# Patient Record
Sex: MALE | Race: ASIAN | URBAN - METROPOLITAN AREA
[De-identification: names, ages, dates, MRNs, and addresses within clinical notes are randomized per-mention and may not be internally consistent; named-entity substitution may affect disease eponyms.]

---

## 2019-01-03 ENCOUNTER — EMERGENCY (EMERGENCY)
Facility: HOSPITAL | Age: 44
LOS: 1 days | Discharge: HOME | End: 2019-01-03
Attending: EMERGENCY MEDICINE | Admitting: EMERGENCY MEDICINE
Payer: COMMERCIAL

## 2019-01-03 VITALS
DIASTOLIC BLOOD PRESSURE: 99 MMHG | SYSTOLIC BLOOD PRESSURE: 155 MMHG | HEART RATE: 66 BPM | OXYGEN SATURATION: 98 % | RESPIRATION RATE: 17 BRPM

## 2019-01-03 VITALS
OXYGEN SATURATION: 98 % | HEART RATE: 86 BPM | DIASTOLIC BLOOD PRESSURE: 103 MMHG | RESPIRATION RATE: 18 BRPM | SYSTOLIC BLOOD PRESSURE: 154 MMHG | TEMPERATURE: 98 F | WEIGHT: 164.91 LBS

## 2019-01-03 DIAGNOSIS — R10.84 GENERALIZED ABDOMINAL PAIN: ICD-10-CM

## 2019-01-03 DIAGNOSIS — K62.5 HEMORRHAGE OF ANUS AND RECTUM: ICD-10-CM

## 2019-01-03 DIAGNOSIS — I10 ESSENTIAL (PRIMARY) HYPERTENSION: ICD-10-CM

## 2019-01-03 LAB
ALBUMIN SERPL ELPH-MCNC: 4.1 G/DL — SIGNIFICANT CHANGE UP (ref 3.4–5)
ALP SERPL-CCNC: 74 U/L — SIGNIFICANT CHANGE UP (ref 40–120)
ALT FLD-CCNC: 38 U/L — SIGNIFICANT CHANGE UP (ref 12–42)
AMYLASE P1 CFR SERPL: 51 U/L — SIGNIFICANT CHANGE UP (ref 25–115)
ANION GAP SERPL CALC-SCNC: 10 MMOL/L — SIGNIFICANT CHANGE UP (ref 9–16)
APPEARANCE UR: CLEAR — SIGNIFICANT CHANGE UP
APTT BLD: 30.2 SEC — SIGNIFICANT CHANGE UP (ref 27.5–36.3)
AST SERPL-CCNC: 25 U/L — SIGNIFICANT CHANGE UP (ref 15–37)
BILIRUB SERPL-MCNC: 0.9 MG/DL — SIGNIFICANT CHANGE UP (ref 0.2–1.2)
BILIRUB UR-MCNC: NEGATIVE — SIGNIFICANT CHANGE UP
BUN SERPL-MCNC: 22 MG/DL — SIGNIFICANT CHANGE UP (ref 7–23)
CALCIUM SERPL-MCNC: 9.6 MG/DL — SIGNIFICANT CHANGE UP (ref 8.5–10.5)
CHLORIDE SERPL-SCNC: 103 MMOL/L — SIGNIFICANT CHANGE UP (ref 96–108)
CO2 SERPL-SCNC: 23 MMOL/L — SIGNIFICANT CHANGE UP (ref 22–31)
COLOR SPEC: YELLOW — SIGNIFICANT CHANGE UP
CREAT SERPL-MCNC: 0.97 MG/DL — SIGNIFICANT CHANGE UP (ref 0.5–1.3)
DIFF PNL FLD: NEGATIVE — SIGNIFICANT CHANGE UP
GLUCOSE SERPL-MCNC: 133 MG/DL — HIGH (ref 70–99)
GLUCOSE UR QL: NEGATIVE — SIGNIFICANT CHANGE UP
HCT VFR BLD CALC: 45.3 % — SIGNIFICANT CHANGE UP (ref 39–50)
HGB BLD-MCNC: 16.1 G/DL — SIGNIFICANT CHANGE UP (ref 13–17)
INR BLD: 0.94 — SIGNIFICANT CHANGE UP (ref 0.88–1.16)
KETONES UR-MCNC: NEGATIVE — SIGNIFICANT CHANGE UP
LEUKOCYTE ESTERASE UR-ACNC: NEGATIVE — SIGNIFICANT CHANGE UP
LIDOCAIN IGE QN: 164 U/L — SIGNIFICANT CHANGE UP (ref 73–393)
MCHC RBC-ENTMCNC: 31.8 PG — SIGNIFICANT CHANGE UP (ref 27–34)
MCHC RBC-ENTMCNC: 35.5 G/DL — SIGNIFICANT CHANGE UP (ref 32–36)
MCV RBC AUTO: 89.5 FL — SIGNIFICANT CHANGE UP (ref 80–100)
NITRITE UR-MCNC: NEGATIVE — SIGNIFICANT CHANGE UP
OB PNL STL: POSITIVE
PH UR: 5.5 — SIGNIFICANT CHANGE UP (ref 5–8)
PLATELET # BLD AUTO: 247 K/UL — SIGNIFICANT CHANGE UP (ref 150–400)
POTASSIUM SERPL-MCNC: 4.2 MMOL/L — SIGNIFICANT CHANGE UP (ref 3.5–5.3)
POTASSIUM SERPL-SCNC: 4.2 MMOL/L — SIGNIFICANT CHANGE UP (ref 3.5–5.3)
PROT SERPL-MCNC: 7.9 G/DL — SIGNIFICANT CHANGE UP (ref 6.4–8.2)
PROT UR-MCNC: NEGATIVE MG/DL — SIGNIFICANT CHANGE UP
PROTHROM AB SERPL-ACNC: 10.3 SEC — SIGNIFICANT CHANGE UP (ref 10–12.9)
RBC # BLD: 5.06 M/UL — SIGNIFICANT CHANGE UP (ref 4.2–5.8)
RBC # FLD: 11.4 % — SIGNIFICANT CHANGE UP (ref 10.3–14.5)
SODIUM SERPL-SCNC: 136 MMOL/L — SIGNIFICANT CHANGE UP (ref 132–145)
SP GR SPEC: 1.02 — SIGNIFICANT CHANGE UP (ref 1–1.03)
UROBILINOGEN FLD QL: 0.2 E.U./DL — SIGNIFICANT CHANGE UP
WBC # BLD: 7.5 K/UL — SIGNIFICANT CHANGE UP (ref 3.8–10.5)
WBC # FLD AUTO: 7.5 K/UL — SIGNIFICANT CHANGE UP (ref 3.8–10.5)

## 2019-01-03 PROCEDURE — 99285 EMERGENCY DEPT VISIT HI MDM: CPT

## 2019-01-03 PROCEDURE — 74177 CT ABD & PELVIS W/CONTRAST: CPT | Mod: 26

## 2019-01-03 RX ORDER — ACETAMINOPHEN 500 MG
975 TABLET ORAL ONCE
Qty: 0 | Refills: 0 | Status: COMPLETED | OUTPATIENT
Start: 2019-01-03 | End: 2019-01-03

## 2019-01-03 RX ORDER — SODIUM CHLORIDE 9 MG/ML
1000 INJECTION INTRAMUSCULAR; INTRAVENOUS; SUBCUTANEOUS ONCE
Qty: 0 | Refills: 0 | Status: COMPLETED | OUTPATIENT
Start: 2019-01-03 | End: 2019-01-03

## 2019-01-03 RX ORDER — MORPHINE SULFATE 50 MG/1
4 CAPSULE, EXTENDED RELEASE ORAL ONCE
Qty: 0 | Refills: 0 | Status: DISCONTINUED | OUTPATIENT
Start: 2019-01-03 | End: 2019-01-03

## 2019-01-03 RX ORDER — IOHEXOL 300 MG/ML
30 INJECTION, SOLUTION INTRAVENOUS ONCE
Qty: 0 | Refills: 0 | Status: COMPLETED | OUTPATIENT
Start: 2019-01-03 | End: 2019-01-03

## 2019-01-03 RX ORDER — MORPHINE SULFATE 50 MG/1
2 CAPSULE, EXTENDED RELEASE ORAL ONCE
Qty: 0 | Refills: 0 | Status: DISCONTINUED | OUTPATIENT
Start: 2019-01-03 | End: 2019-01-03

## 2019-01-03 RX ORDER — ONDANSETRON 8 MG/1
4 TABLET, FILM COATED ORAL ONCE
Qty: 0 | Refills: 0 | Status: COMPLETED | OUTPATIENT
Start: 2019-01-03 | End: 2019-01-03

## 2019-01-03 RX ADMIN — MORPHINE SULFATE 4 MILLIGRAM(S): 50 CAPSULE, EXTENDED RELEASE ORAL at 10:31

## 2019-01-03 RX ADMIN — SODIUM CHLORIDE 1000 MILLILITER(S): 9 INJECTION INTRAMUSCULAR; INTRAVENOUS; SUBCUTANEOUS at 10:31

## 2019-01-03 RX ADMIN — Medication 975 MILLIGRAM(S): at 09:30

## 2019-01-03 RX ADMIN — Medication 975 MILLIGRAM(S): at 10:31

## 2019-01-03 RX ADMIN — IOHEXOL 30 MILLILITER(S): 300 INJECTION, SOLUTION INTRAVENOUS at 09:30

## 2019-01-03 RX ADMIN — SODIUM CHLORIDE 500 MILLILITER(S): 9 INJECTION INTRAMUSCULAR; INTRAVENOUS; SUBCUTANEOUS at 09:28

## 2019-01-03 RX ADMIN — ONDANSETRON 4 MILLIGRAM(S): 8 TABLET, FILM COATED ORAL at 09:30

## 2019-01-03 RX ADMIN — MORPHINE SULFATE 2 MILLIGRAM(S): 50 CAPSULE, EXTENDED RELEASE ORAL at 09:45

## 2019-01-03 RX ADMIN — MORPHINE SULFATE 2 MILLIGRAM(S): 50 CAPSULE, EXTENDED RELEASE ORAL at 09:30

## 2019-01-03 NOTE — ED ADULT NURSE NOTE - NSIMPLEMENTINTERV_GEN_ALL_ED
Implemented All Universal Safety Interventions:  [place] to call system. Call bell, personal items and telephone within reach. Instruct patient to call for assistance. Room bathroom lighting operational. Non-slip footwear when patient is off stretcher. Physically safe environment: no spills, clutter or unnecessary equipment. Stretcher in lowest position, wheels locked, appropriate side rails in place.

## 2019-01-03 NOTE — SBIRT NOTE. - NSSBIRTSERVICES_GEN_A_ED_FT
Provided SBIRT services. Full Screen Positive. Brief Intervention Performed. Screening results were reviewed with the patient and patient was provided information about healthy guidelines and potential negative consequences associated with level of risk.   Motivation and readiness to reduce or stop use was discussed and goals and activities to make changes were suggested/offered.    Audit Score : 4    Dast Score : 2    Duration : 15 minutes

## 2019-01-03 NOTE — ED PROVIDER NOTE - OBJECTIVE STATEMENT
44 y/o male with PMHx of HTN presents to the ED with complaints of diffuse abd pain x 1 day, blood on toilet paper when wiping, and nausea. Denies vomiting, diarrhea, constipation, urinary complaints, or anticoagulants usage.

## 2019-01-03 NOTE — ED ADULT NURSE NOTE - OBJECTIVE STATEMENT
pt is a 44 y/o male presents ambulatory into the ED generalized abdominal pain with nausea and loose stool with blood (when wiping) since Sunday, worsening today. denies fevers/chills, vomiting, urinary sx, overuse of NSAIDS, anticoagulants.

## 2019-01-03 NOTE — ED PROVIDER NOTE - MEDICAL DECISION MAKING DETAILS
44 y/o male with diffuse abd pain and rectal bleeding. Will get CT abd and pelvis to r/o colitis. HNH stable here. Pt without any symptomatic anemia. 42 y/o male with diffuse abd pain and rectal bleeding. Will get CT abd and pelvis to eval for colitis. H&H stable here. Pt without any symptomatic anemia symptoms

## 2019-01-03 NOTE — ED PROVIDER NOTE - PROGRESS NOTE DETAILS
marie: PT seen and reassessed.  Patient symptomatically improved.   AAOX3, NAD, VSS.  Discussed test results w/ patient. Patient verbalized understanding of hospital course and outpatient plans, has decisional making capacity.  Will f/u w/ pmd in the next few days; patient will call for an appointment. Will return to the ED if there is any worsening of symptoms.  Patient able to ambulate at baseline, is tolerating PO intake The scribe's documentation has been prepared under my direction and personally reviewed by me in its entirety. I confirm that the note above accurately reflects all work, treatment, procedures, and medical decision making performed by me.  -Tre Zuniga MD

## 2020-10-21 ENCOUNTER — TRANSCRIPTION ENCOUNTER (OUTPATIENT)
Age: 45
End: 2020-10-21

## 2021-05-04 ENCOUNTER — TRANSCRIPTION ENCOUNTER (OUTPATIENT)
Age: 46
End: 2021-05-04

## 2021-06-26 ENCOUNTER — TRANSCRIPTION ENCOUNTER (OUTPATIENT)
Age: 46
End: 2021-06-26

## 2022-08-18 ENCOUNTER — EMERGENCY (EMERGENCY)
Facility: HOSPITAL | Age: 47
LOS: 1 days | Discharge: HOME | End: 2022-08-18
Attending: EMERGENCY MEDICINE
Payer: COMMERCIAL

## 2022-08-18 VITALS
TEMPERATURE: 98 F | HEART RATE: 82 BPM | SYSTOLIC BLOOD PRESSURE: 102 MMHG | WEIGHT: 169.09 LBS | RESPIRATION RATE: 19 BRPM | DIASTOLIC BLOOD PRESSURE: 70 MMHG | HEIGHT: 69 IN | OXYGEN SATURATION: 97 %

## 2022-08-18 LAB
ALBUMIN SERPL ELPH-MCNC: 4.6 G/DL — SIGNIFICANT CHANGE UP (ref 3.3–5)
ALP SERPL-CCNC: 77 U/L — SIGNIFICANT CHANGE UP (ref 40–120)
ALT FLD-CCNC: 19 U/L — SIGNIFICANT CHANGE UP (ref 10–45)
ANION GAP SERPL CALC-SCNC: 14 MMOL/L — SIGNIFICANT CHANGE UP (ref 5–17)
APTT BLD: 29.6 SEC — SIGNIFICANT CHANGE UP (ref 27.5–35.5)
AST SERPL-CCNC: 23 U/L — SIGNIFICANT CHANGE UP (ref 10–40)
BASOPHILS # BLD AUTO: 0.04 K/UL — SIGNIFICANT CHANGE UP (ref 0–0.2)
BASOPHILS NFR BLD AUTO: 0.5 % — SIGNIFICANT CHANGE UP (ref 0–2)
BILIRUB SERPL-MCNC: 1.1 MG/DL — SIGNIFICANT CHANGE UP (ref 0.2–1.2)
BUN SERPL-MCNC: 18 MG/DL — SIGNIFICANT CHANGE UP (ref 7–23)
CALCIUM SERPL-MCNC: 10.5 MG/DL — SIGNIFICANT CHANGE UP (ref 8.4–10.5)
CHLORIDE SERPL-SCNC: 104 MMOL/L — SIGNIFICANT CHANGE UP (ref 96–108)
CO2 SERPL-SCNC: 22 MMOL/L — SIGNIFICANT CHANGE UP (ref 22–31)
CREAT SERPL-MCNC: 1.43 MG/DL — HIGH (ref 0.5–1.3)
EGFR: 60 ML/MIN/1.73M2 — SIGNIFICANT CHANGE UP
EOSINOPHIL # BLD AUTO: 0.1 K/UL — SIGNIFICANT CHANGE UP (ref 0–0.5)
EOSINOPHIL NFR BLD AUTO: 1.2 % — SIGNIFICANT CHANGE UP (ref 0–6)
FLUAV AG NPH QL: SIGNIFICANT CHANGE UP
FLUBV AG NPH QL: SIGNIFICANT CHANGE UP
GLUCOSE SERPL-MCNC: 100 MG/DL — HIGH (ref 70–99)
HCT VFR BLD CALC: 42.9 % — SIGNIFICANT CHANGE UP (ref 39–50)
HGB BLD-MCNC: 14.4 G/DL — SIGNIFICANT CHANGE UP (ref 13–17)
IMM GRANULOCYTES NFR BLD AUTO: 0.5 % — SIGNIFICANT CHANGE UP (ref 0–1.5)
INR BLD: 1.12 RATIO — SIGNIFICANT CHANGE UP (ref 0.88–1.16)
LYMPHOCYTES # BLD AUTO: 1.73 K/UL — SIGNIFICANT CHANGE UP (ref 1–3.3)
LYMPHOCYTES # BLD AUTO: 19.9 % — SIGNIFICANT CHANGE UP (ref 13–44)
MAGNESIUM SERPL-MCNC: 2.5 MG/DL — SIGNIFICANT CHANGE UP (ref 1.6–2.6)
MCHC RBC-ENTMCNC: 28.8 PG — SIGNIFICANT CHANGE UP (ref 27–34)
MCHC RBC-ENTMCNC: 33.6 GM/DL — SIGNIFICANT CHANGE UP (ref 32–36)
MCV RBC AUTO: 85.8 FL — SIGNIFICANT CHANGE UP (ref 80–100)
MONOCYTES # BLD AUTO: 0.57 K/UL — SIGNIFICANT CHANGE UP (ref 0–0.9)
MONOCYTES NFR BLD AUTO: 6.6 % — SIGNIFICANT CHANGE UP (ref 2–14)
NEUTROPHILS # BLD AUTO: 6.2 K/UL — SIGNIFICANT CHANGE UP (ref 1.8–7.4)
NEUTROPHILS NFR BLD AUTO: 71.3 % — SIGNIFICANT CHANGE UP (ref 43–77)
NRBC # BLD: 0 /100 WBCS — SIGNIFICANT CHANGE UP (ref 0–0)
PLATELET # BLD AUTO: 241 K/UL — SIGNIFICANT CHANGE UP (ref 150–400)
POTASSIUM SERPL-MCNC: 3.8 MMOL/L — SIGNIFICANT CHANGE UP (ref 3.5–5.3)
POTASSIUM SERPL-SCNC: 3.8 MMOL/L — SIGNIFICANT CHANGE UP (ref 3.5–5.3)
PROT SERPL-MCNC: 7.2 G/DL — SIGNIFICANT CHANGE UP (ref 6–8.3)
PROTHROM AB SERPL-ACNC: 12.9 SEC — SIGNIFICANT CHANGE UP (ref 10.5–13.4)
RBC # BLD: 5 M/UL — SIGNIFICANT CHANGE UP (ref 4.2–5.8)
RBC # FLD: 12.3 % — SIGNIFICANT CHANGE UP (ref 10.3–14.5)
RSV RNA NPH QL NAA+NON-PROBE: SIGNIFICANT CHANGE UP
SARS-COV-2 RNA SPEC QL NAA+PROBE: SIGNIFICANT CHANGE UP
SODIUM SERPL-SCNC: 140 MMOL/L — SIGNIFICANT CHANGE UP (ref 135–145)
TROPONIN T, HIGH SENSITIVITY RESULT: 17 NG/L — SIGNIFICANT CHANGE UP (ref 0–51)
TROPONIN T, HIGH SENSITIVITY RESULT: 21 NG/L — SIGNIFICANT CHANGE UP (ref 0–51)
TSH SERPL-MCNC: 0.64 UIU/ML — SIGNIFICANT CHANGE UP (ref 0.27–4.2)
WBC # BLD: 8.68 K/UL — SIGNIFICANT CHANGE UP (ref 3.8–10.5)
WBC # FLD AUTO: 8.68 K/UL — SIGNIFICANT CHANGE UP (ref 3.8–10.5)

## 2022-08-18 PROCEDURE — 99220: CPT

## 2022-08-18 PROCEDURE — 71045 X-RAY EXAM CHEST 1 VIEW: CPT | Mod: 26

## 2022-08-18 PROCEDURE — 93010 ELECTROCARDIOGRAM REPORT: CPT

## 2022-08-18 RX ORDER — HEPARIN SODIUM 5000 [USP'U]/ML
5000 INJECTION INTRAVENOUS; SUBCUTANEOUS EVERY 12 HOURS
Refills: 0 | Status: DISCONTINUED | OUTPATIENT
Start: 2022-08-18 | End: 2022-08-18

## 2022-08-18 RX ORDER — ASPIRIN/CALCIUM CARB/MAGNESIUM 324 MG
81 TABLET ORAL DAILY
Refills: 0 | Status: DISCONTINUED | OUTPATIENT
Start: 2022-08-19 | End: 2022-08-22

## 2022-08-18 RX ORDER — SODIUM CHLORIDE 9 MG/ML
1000 INJECTION INTRAMUSCULAR; INTRAVENOUS; SUBCUTANEOUS ONCE
Refills: 0 | Status: COMPLETED | OUTPATIENT
Start: 2022-08-18 | End: 2022-08-18

## 2022-08-18 RX ORDER — METOPROLOL TARTRATE 50 MG
25 TABLET ORAL DAILY
Refills: 0 | Status: DISCONTINUED | OUTPATIENT
Start: 2022-08-18 | End: 2022-08-22

## 2022-08-18 RX ADMIN — SODIUM CHLORIDE 1000 MILLILITER(S): 9 INJECTION INTRAMUSCULAR; INTRAVENOUS; SUBCUTANEOUS at 20:25

## 2022-08-18 RX ADMIN — Medication 25 MILLIGRAM(S): at 22:43

## 2022-08-18 NOTE — ED PROVIDER NOTE - PHYSICAL EXAMINATION
GENERAL: Not in acute distress, non-toxic appearing  HEAD: normocephalic, atraumatic  HEENT: PERRLA, EOMI, normal conjunctiva, oral mucosa moist, neck supple  CARDIAC: regular rate and rhythm, normal S1 and S2,  no appreciable murmurs  PULM: clear to ascultation bilaterally, no crackles, rales, rhonchi, or wheezing  GI: abdomen nondistended, soft, nontender, no guarding or rebound tenderness  : No CVA tenderness, no suprapubic tenderness  NEURO: alert and oriented x 3, normal speech, no focal motor or sensory deficits, gait normal, no gross neurologic deficit  MSK: No visible deformities, no peripheral edema, calf tenderness/redness/swelling  SKIN: No visible rashes, dry, well-perfused  PSYCH: appropriate mood and affect

## 2022-08-18 NOTE — ED PROVIDER NOTE - OBJECTIVE STATEMENT
Patient is a 45 y/o male with no past medical history, not on any medications presented to the ER after he experienced palpitations and syncopal episode. Patient was playing golf and exerting himself when he experienced palpitations, changes in vision, and experienced a transient loss of consciousness lasting for a few seconds with complete regaining of his mental faculties. Patient did not experience any urinary/bowel incontinence. He denies any focal neurological deficits associated with this episode. Denies ever experiencing similar symptoms in the past. Patient reports a few transient episodes of palpitations which self resolved.    Upon the syncopal episode today, the patient presented to the urgent care where he was found to be in afib on 12 lead EKG. Patient self converted. Patient was given 162 mg of ASA and was advised to follow up in the ER.     Patient otherwise denies any complaints at the moment such as chest pain, palpitations, nausea, vomiting, headaches, neurological deficits, Patient is a 47 y/o male with no past medical history, not on any medications presented to the ER after he experienced palpitations and syncopal episode. Patient was playing golf and exerting himself when he experienced palpitations, changes in vision, and experienced a transient loss of consciousness lasting for a few seconds with complete regaining of his mental faculties. Patient did not experience any urinary/bowel incontinence. He denies any focal neurological deficits associated with this episode. Denies ever experiencing similar symptoms in the past. Patient reports a few transient episodes of palpitations which self resolved.    Upon the syncopal episode today, the patient presented to the urgent care where he was found to be in afib on 12 lead EKG. Patient self converted. Patient was given 162 mg of ASA and was advised to follow up in the ER.     Patient otherwise denies any complaints at the moment such as chest pain, palpitations, nausea, vomiting, headaches, neurological deficits,

## 2022-08-18 NOTE — ED CDU PROVIDER INITIAL DAY NOTE - OBJECTIVE STATEMENT
Patient is a 47 y/o male with no past medical history, not on any medications presented to the ER after he experienced palpitations and syncopal episode. Patient was playing golf and exerting himself when he experienced palpitations, changes in vision, and experienced a transient loss of consciousness lasting for a few seconds with complete regaining of his mental faculties. Patient did not experience any urinary/bowel incontinence. He denies any focal neurological deficits associated with this episode. Denies ever experiencing similar symptoms in the past. Patient reports a few transient episodes of palpitations which self resolved. Upon the syncopal episode today, the patient presented to the urgent care where he was found to be in Afib on 12 lead EKG. Patient self converted. Patient was given 162 mg of ASA and was advised to follow up in the ER.  Patient otherwise denies any complaints at the moment such as chest pain, palpitations, nausea, vomiting, headaches. PMD [facility].  ED course: EKG NSR. Cardiology consulted. Plan for echo and cardiology recommendations in CDU. Patient is a 45 y/o male with no past medical history, not on any medications presented to the ER after he experienced palpitations and syncopal episode. Patient was playing golf and exerting himself when he experienced palpitations, changes in vision, and experienced a transient loss of consciousness lasting for a few seconds with complete regaining of his mental faculties. Patient did not experience any urinary/bowel incontinence. He denies any focal neurological deficits associated with this episode. Denies ever experiencing similar symptoms in the past. Patient reports a few transient episodes of palpitations which self resolved. Upon the syncopal episode today, the patient presented to the urgent care where he was found to be in Afib on 12 lead EKG. Patient self converted. Patient was given 162 mg of ASA and was advised to follow up in the ER.  Patient otherwise denies any complaints at the moment such as chest pain, palpitations, nausea, vomiting, headaches. PMD [facility] [place].  ED course: EKG NSR. Cardiology consulted. Plan for echo and cardiology recommendations in CDU. Patient is a 45 y/o male with no past medical history, not on any medications presented to the ER after he experienced palpitations and syncopal episode. Patient was playing golf and exerting himself when he experienced palpitations, changes in vision, and experienced a transient loss of consciousness lasting for a few seconds with complete regaining of his mental faculties. Patient did not experience any urinary/bowel incontinence. He denies any focal neurological deficits associated with this episode. Denies ever experiencing similar symptoms in the past. Patient reports a few transient episodes of palpitations which self resolved. Upon the syncopal episode today, the patient presented to the urgent care where he was found to be in Afib. Patient self converted to NSR. Reportedly given Aspirin.  Patient otherwise denies any complaints at the moment such as chest pain, palpitations, nausea, vomiting, headaches. PMD [facility] [place].  ED course: EKG NSR. Cardiology consulted. Plan for echo and cardiology recommendations in CDU. Patient is a 47 y/o male with no past medical history, not on any medications presented to the ER after he experienced palpitations and syncopal episode. Patient was playing golf and exerting himself when he experienced palpitations, changes in vision, and experienced a transient loss of consciousness lasting for a few seconds with complete regaining of his mental faculties. Patient did not experience any urinary/bowel incontinence. He denies any focal neurological deficits associated with this episode. Denies ever experiencing similar symptoms in the past. Patient reports a few transient episodes of palpitations which self resolved. Upon the syncopal episode today, the patient presented to the urgent care where he was found to be in Afib. Patient self converted to NSR. Reportedly given Aspirin.  Patient otherwise denies any complaints at the moment such as chest pain, palpitations, nausea, vomiting, headaches. PMD [facility] [place].  ED course: EKG NSR. Cardiology consulted. Plan for echo and cardiology recommendations in CDU.

## 2022-08-18 NOTE — ED ADULT TRIAGE NOTE - NS ED NURSE AMBULANCES
[facility]

## 2022-08-18 NOTE — ED CDU PROVIDER INITIAL DAY NOTE - MEDICAL DECISION MAKING DETAILS
45 y/o male  ,a lot of stress at work ,looking for another job  with no past medical history presented with palpitations and syncopal episode while playing golf. Afib ON ecg at [facility] . Patient self converted and now in sinus rhythm. Patient otherwise denies chest pain. Troponin is normal. Spoke with Dr. Sarmiento cardiology, plan to admit to CDU. Repeat troponin and EKG. Echo and holter tomorrow.  ZR 47 y/o male  ,a lot of stress at work ,looking for another job  with no past medical history presented with palpitations and syncopal episode while playing golf. Afib ON ecg at [facility] . Patient self converted and now in sinus rhythm. Patient otherwise denies chest pain. Troponin is normal. Spoke with Dr. Sarmiento cardiology, plan to admit to CDU. Repeat troponin and EKG. Echo and holter tomorrow.  ZR

## 2022-08-18 NOTE — CONSULT NOTE ADULT - SUBJECTIVE AND OBJECTIVE BOX
CHIEF COMPLAINT:Patient is a 46y old  Male who presents with a chief complaint of     HPI:  Patient is a 45 y/o male with no past medical history, not on any medications presented to the ER after he experienced palpitations and syncopal episode. Patient was playing golf and exerting himself when he experienced palpitations, changes in vision, and experienced a transient loss of consciousness lasting for a few seconds with complete regaining of his mental faculties. Patient did not experience any urinary/bowel incontinence. He denies any focal neurological deficits associated with this episode. Denies ever experiencing similar symptoms in the past. Patient reports a few transient episodes of palpitations which self resolved.  	Upon the syncopal episode today, the patient presented to the urgent care where he was found to be in afib on 12 lead EKG. Patient self converted. Patient was given 162 mg of ASA and was advised to follow up in the ER.     	Patient otherwise denies any complaints at the moment such as chest pain, palpitations, nausea, vomiting, headaches, neurological deficits,    PAST MEDICAL & SURGICAL HISTORY:      MEDICATIONS  (STANDING):  none    MEDICATIONS  (PRN):      FAMILY HISTORY:      SOCIAL HISTORY:    [x ] Non-smoker  [ ] Smoker  [ ] Alcohol    Allergies    dust (Urticaria)  No Known Drug Allergies    Intolerances    	    REVIEW OF SYSTEMS:  CONSTITUTIONAL: No fever, weight loss, or fatigue  EYES: No eye pain, visual disturbances, or discharge  ENT:  No difficulty hearing, tinnitus, vertigo; No sinus or throat pain  NECK: No pain or stiffness  RESPIRATORY: No cough, wheezing, chills or hemoptysis; No Shortness of Breath  CARDIOVASCULAR: No chest pain, + palpitations, passing out, dizziness, or leg swelling  GASTROINTESTINAL: No abdominal or epigastric pain. No nausea, vomiting, or hematemesis; No diarrhea or constipation. No melena or hematochezia.  GENITOURINARY: No dysuria, frequency, hematuria, or incontinence  NEUROLOGICAL: No headaches, memory loss, loss of strength, numbness, or tremors  SKIN: No itching, burning, rashes, or lesions   LYMPH Nodes: No enlarged glands  ENDOCRINE: No heat or cold intolerance; No hair loss  MUSCULOSKELETAL: No joint pain or swelling; No muscle, back, or extremity pain  PSYCHIATRIC: No depression, anxiety, mood swings, or difficulty sleeping  HEME/LYMPH: No easy bruising, or bleeding gums  ALLERGY AND IMMUNOLOGIC: No hives or eczema	    [ ] All others negative	  [ ] Unable to obtain    PHYSICAL EXAM:  T(C): 36.7 (08-18-22 @ 20:54), Max: 36.7 (08-18-22 @ 16:59)  HR: 70 (08-18-22 @ 20:51) (70 - 82)  BP: 130/85 (08-18-22 @ 20:51) (102/70 - 130/85)  RR: 18 (08-18-22 @ 20:51) (18 - 19)  SpO2: 99% (08-18-22 @ 20:51) (97% - 99%)  Wt(kg): --  I&O's Summary      Appearance: Normal	  HEENT:   Normal oral mucosa, PERRL, EOMI	  Lymphatic: No lymphadenopathy  Cardiovascular: Normal S1 S2, No JVD, + murmurs, No edema  Respiratory: Lungs clear to auscultation	  Psychiatry: A & O x 3, Mood & affect appropriate  Gastrointestinal:  Soft, Non-tender, + BS	  Skin: No rashes, No ecchymoses, No cyanosis	  Neurologic: Non-focal  Extremities: Normal range of motion, No clubbing, cyanosis or edema  Vascular: Peripheral pulses palpable 2+ bilaterally    TELEMETRY: 	    ECG:  	  RADIOLOGY:  OTHER: 	  	  LABS:	 	    CARDIAC MARKERS:  CHIEF COMPLAINT:Patient is a 46y old  Male who presents with a chief complaint of     HPI:      PAST MEDICAL & SURGICAL HISTORY:      MEDICATIONS  (STANDING):    MEDICATIONS  (PRN):      FAMILY HISTORY:      SOCIAL HISTORY:    [ ] Non-smoker  [ ] Smoker  [ ] Alcohol    Allergies    dust (Urticaria)  No Known Drug Allergies    Intolerances    	    REVIEW OF SYSTEMS:  CONSTITUTIONAL: No fever, weight loss, or fatigue  EYES: No eye pain, visual disturbances, or discharge  ENT:  No difficulty hearing, tinnitus, vertigo; No sinus or throat pain  NECK: No pain or stiffness  RESPIRATORY: No cough, wheezing, chills or hemoptysis; No Shortness of Breath  CARDIOVASCULAR: No chest pain, palpitations, passing out, dizziness, or leg swelling  GASTROINTESTINAL: No abdominal or epigastric pain. No nausea, vomiting, or hematemesis; No diarrhea or constipation. No melena or hematochezia.  GENITOURINARY: No dysuria, frequency, hematuria, or incontinence  NEUROLOGICAL: No headaches, memory loss, loss of strength, numbness, or tremors  SKIN: No itching, burning, rashes, or lesions   LYMPH Nodes: No enlarged glands  ENDOCRINE: No heat or cold intolerance; No hair loss  MUSCULOSKELETAL: No joint pain or swelling; No muscle, back, or extremity pain  PSYCHIATRIC: No depression, anxiety, mood swings, or difficulty sleeping  HEME/LYMPH: No easy bruising, or bleeding gums  ALLERGY AND IMMUNOLOGIC: No hives or eczema	    [ ] All others negative	  [ ] Unable to obtain    PHYSICAL EXAM:  T(C): 36.7 (08-18-22 @ 20:54), Max: 36.7 (08-18-22 @ 16:59)  HR: 70 (08-18-22 @ 20:51) (70 - 82)  BP: 130/85 (08-18-22 @ 20:51) (102/70 - 130/85)  RR: 18 (08-18-22 @ 20:51) (18 - 19)  SpO2: 99% (08-18-22 @ 20:51) (97% - 99%)  Wt(kg): --  I&O's Summary      Appearance: Normal	  HEENT:   Normal oral mucosa, PERRL, EOMI	  Lymphatic: No lymphadenopathy  Cardiovascular: Normal S1 S2, No JVD, + murmurs, No edema  Respiratory: Lungs clear to auscultation	  Psychiatry: A & O x 3, Mood & affect appropriate  Gastrointestinal:  Soft, Non-tender, + BS	  Skin: No rashes, No ecchymoses, No cyanosis	  Neurologic: Non-focal  Extremities: Normal range of motion, No clubbing, cyanosis or edema  Vascular: Peripheral pulses palpable 2+ bilaterally    TELEMETRY: 	    ECG:  	  RADIOLOGY:  OTHER: 	  	  LABS:	 	    CARDIAC MARKERS:                              14.4   8.68  )-----------( 241      ( 18 Aug 2022 17:41 )             42.9     08-18    142  |  104  |  18  ----------------------------<  109<H>  3.8   |  25  |  1.25    Ca    10.0      18 Aug 2022 20:21  Mg     2.5     08-18    TPro  7.2  /  Alb  4.6  /  TBili  1.1  /  DBili  x   /  AST  23  /  ALT  19  /  AlkPhos  77  08-18    proBNP:   Lipid Profile:   HgA1c:   TSH:   PT/INR - ( 18 Aug 2022 17:41 )   PT: 12.9 sec;   INR: 1.12 ratio         PTT - ( 18 Aug 2022 17:41 )  PTT:29.6 sec    PREVIOUS DIAGNOSTIC TESTING:    < from: Xray Chest 1 View- PORTABLE-Urgent (08.18.22 @ 17:25) >  INTERPRETATION:  clear lungs                 CHIEF COMPLAINT:Patient is a 46y old  Male who presents with a chief complaint of     HPI:  Patient is a 47 y/o male with no past medical history, not on any medications presented to the ER after he experienced palpitations and syncopal episode. Patient was playing golf and exerting himself when he experienced palpitations, changes in vision, and experienced a transient loss of consciousness lasting for a few seconds with complete regaining of his mental faculties. Patient did not experience any urinary/bowel incontinence. He denies any focal neurological deficits associated with this episode. Denies ever experiencing similar symptoms in the past. Patient reports a few transient episodes of palpitations which self resolved.  	Upon the syncopal episode today, the patient presented to the urgent care where he was found to be in afib on 12 lead EKG. Patient self converted. Patient was given 162 mg of ASA and was advised to follow up in the ER.     	Patient otherwise denies any complaints at the moment such as chest pain, palpitations, nausea, vomiting, headaches, neurological deficits,    PAST MEDICAL & SURGICAL HISTORY:      MEDICATIONS  (STANDING):  none    MEDICATIONS  (PRN):      FAMILY HISTORY:      SOCIAL HISTORY:    [x ] Non-smoker  [ ] Smoker  [ ] Alcohol    Allergies    dust (Urticaria)  No Known Drug Allergies    Intolerances    	    REVIEW OF SYSTEMS:  CONSTITUTIONAL: No fever, weight loss, or fatigue  EYES: No eye pain, visual disturbances, or discharge  ENT:  No difficulty hearing, tinnitus, vertigo; No sinus or throat pain  NECK: No pain or stiffness  RESPIRATORY: No cough, wheezing, chills or hemoptysis; No Shortness of Breath  CARDIOVASCULAR: No chest pain, + palpitations, passing out, dizziness, or leg swelling  GASTROINTESTINAL: No abdominal or epigastric pain. No nausea, vomiting, or hematemesis; No diarrhea or constipation. No melena or hematochezia.  GENITOURINARY: No dysuria, frequency, hematuria, or incontinence  NEUROLOGICAL: No headaches, memory loss, loss of strength, numbness, or tremors  SKIN: No itching, burning, rashes, or lesions   LYMPH Nodes: No enlarged glands  ENDOCRINE: No heat or cold intolerance; No hair loss  MUSCULOSKELETAL: No joint pain or swelling; No muscle, back, or extremity pain  PSYCHIATRIC: No depression, anxiety, mood swings, or difficulty sleeping  HEME/LYMPH: No easy bruising, or bleeding gums  ALLERGY AND IMMUNOLOGIC: No hives or eczema	    [ ] All others negative	  [ ] Unable to obtain    PHYSICAL EXAM:  T(C): 36.7 (08-18-22 @ 20:54), Max: 36.7 (08-18-22 @ 16:59)  HR: 70 (08-18-22 @ 20:51) (70 - 82)  BP: 130/85 (08-18-22 @ 20:51) (102/70 - 130/85)  RR: 18 (08-18-22 @ 20:51) (18 - 19)  SpO2: 99% (08-18-22 @ 20:51) (97% - 99%)  Wt(kg): --  I&O's Summary      Appearance: Normal	  HEENT:   Normal oral mucosa, PERRL, EOMI	  Lymphatic: No lymphadenopathy  Cardiovascular: Normal S1 S2, No JVD, + murmurs, No edema  Respiratory: Lungs clear to auscultation	  Psychiatry: A & O x 3, Mood & affect appropriate  Gastrointestinal:  Soft, Non-tender, + BS	  Skin: No rashes, No ecchymoses, No cyanosis	  Neurologic: Non-focal  Extremities: Normal range of motion, No clubbing, cyanosis or edema  Vascular: Peripheral pulses palpable 2+ bilaterally    TELEMETRY: 	    ECG:  	  RADIOLOGY:  OTHER: 	  	  LABS:	 	    CARDIAC MARKERS:  CHIEF COMPLAINT:Patient is a 46y old  Male who presents with a chief complaint of     HPI:      PAST MEDICAL & SURGICAL HISTORY:      MEDICATIONS  (STANDING):    MEDICATIONS  (PRN):      FAMILY HISTORY:      SOCIAL HISTORY:    [ ] Non-smoker  [ ] Smoker  [ ] Alcohol    Allergies    dust (Urticaria)  No Known Drug Allergies    Intolerances    	    REVIEW OF SYSTEMS:  CONSTITUTIONAL: No fever, weight loss, or fatigue  EYES: No eye pain, visual disturbances, or discharge  ENT:  No difficulty hearing, tinnitus, vertigo; No sinus or throat pain  NECK: No pain or stiffness  RESPIRATORY: No cough, wheezing, chills or hemoptysis; No Shortness of Breath  CARDIOVASCULAR: No chest pain, palpitations, passing out, dizziness, or leg swelling  GASTROINTESTINAL: No abdominal or epigastric pain. No nausea, vomiting, or hematemesis; No diarrhea or constipation. No melena or hematochezia.  GENITOURINARY: No dysuria, frequency, hematuria, or incontinence  NEUROLOGICAL: No headaches, memory loss, loss of strength, numbness, or tremors  SKIN: No itching, burning, rashes, or lesions   LYMPH Nodes: No enlarged glands  ENDOCRINE: No heat or cold intolerance; No hair loss  MUSCULOSKELETAL: No joint pain or swelling; No muscle, back, or extremity pain  PSYCHIATRIC: No depression, anxiety, mood swings, or difficulty sleeping  HEME/LYMPH: No easy bruising, or bleeding gums  ALLERGY AND IMMUNOLOGIC: No hives or eczema	    [ ] All others negative	  [ ] Unable to obtain    PHYSICAL EXAM:  T(C): 36.7 (08-18-22 @ 20:54), Max: 36.7 (08-18-22 @ 16:59)  HR: 70 (08-18-22 @ 20:51) (70 - 82)  BP: 130/85 (08-18-22 @ 20:51) (102/70 - 130/85)  RR: 18 (08-18-22 @ 20:51) (18 - 19)  SpO2: 99% (08-18-22 @ 20:51) (97% - 99%)  Wt(kg): --  I&O's Summary      Appearance: Normal	  HEENT:   Normal oral mucosa, PERRL, EOMI	  Lymphatic: No lymphadenopathy  Cardiovascular: Normal S1 S2, No JVD, + murmurs, No edema  Respiratory: Lungs clear to auscultation	  Psychiatry: A & O x 3, Mood & affect appropriate  Gastrointestinal:  Soft, Non-tender, + BS	  Skin: No rashes, No ecchymoses, No cyanosis	  Neurologic: Non-focal  Extremities: Normal range of motion, No clubbing, cyanosis or edema  Vascular: Peripheral pulses palpable 2+ bilaterally    TELEMETRY: 	    ECG:  	  RADIOLOGY:  OTHER: 	  	  LABS:	 	    CARDIAC MARKERS:                              14.4   8.68  )-----------( 241      ( 18 Aug 2022 17:41 )             42.9     08-18    142  |  104  |  18  ----------------------------<  109<H>  3.8   |  25  |  1.25    Ca    10.0      18 Aug 2022 20:21  Mg     2.5     08-18    TPro  7.2  /  Alb  4.6  /  TBili  1.1  /  DBili  x   /  AST  23  /  ALT  19  /  AlkPhos  77  08-18    proBNP:   Lipid Profile:   HgA1c:   TSH:   PT/INR - ( 18 Aug 2022 17:41 )   PT: 12.9 sec;   INR: 1.12 ratio         PTT - ( 18 Aug 2022 17:41 )  PTT:29.6 sec    PREVIOUS DIAGNOSTIC TESTING:    < from: Xray Chest 1 View- PORTABLE-Urgent (08.18.22 @ 17:25) >  INTERPRETATION:  clear lungs

## 2022-08-18 NOTE — ED CDU PROVIDER INITIAL DAY NOTE - PHYSICAL EXAMINATION
GENERAL: Not in acute distress, non-toxic appearing  	HEAD: normocephalic, atraumatic  	HEENT: EOMI, normal conjunctiva, oral mucosa moist, neck supple  	CARDIAC: regular rate and rhythm, normal S1 and S2,  no appreciable murmurs  	PULM: clear to ascultation bilaterally, no crackles, rales, rhonchi, or wheezing  	GI: abdomen nondistended, soft, nontender, no guarding or rebound tenderness  	: No CVA tenderness, no suprapubic tenderness  	NEURO: alert and oriented x 3, normal speech, no focal motor or sensory deficits, gait steady, no gross neurologic deficit  	MSK: No visible deformities, no peripheral edema, calf tenderness/redness/swelling  	SKIN: No visible rashes, dry, well-perfused  PSYCH: appropriate mood and affect GENERAL: Not in acute distress, non-toxic appearing  	HEAD: normocephalic, atraumatic  	HEENT: EOMI, normal conjunctiva  	CARDIAC: regular rate and rhythm, normal S1 and S2,  no appreciable murmurs  	PULM: clear to ascultation bilaterally, no crackles, rales, rhonchi, or wheezing  	GI: abdomen nondistended, soft, nontender  	NEURO: alert and oriented x 3, normal speech, no focal motor or sensory deficits, gait steady, no gross neurologic deficit  	MSK: No visible deformities, no peripheral edema, calf tenderness/redness/swelling  PSYCH: appropriate mood and affect

## 2022-08-18 NOTE — ED CDU PROVIDER INITIAL DAY NOTE - NS ED ATTENDING STATEMENT MOD
This was a shared visit with the JEMAL. I reviewed and verified the documentation and independently performed the documented:

## 2022-08-18 NOTE — ED ADULT TRIAGE NOTE - CHIEF COMPLAINT QUOTE
had an episode of palpitations and syncopal episode today while golfing  was found to be in a fib at urgent care and converted to NSR   given 162 mg asp at urgent care

## 2022-08-18 NOTE — ED ADULT NURSE NOTE - OBJECTIVE STATEMENT
Pt was BIBEMS from  for palpitations. No PMH. As per Pt he was playing golf and walking up a hill and felt palpitations and once he reach the top he felt dizzy and syncope, called his wife and on the drive home he vision became yellow for about 15seconds went to  and was sent to the ED. Pt is OAx4. breathing unlabored on RA symmetrical rise and fall of the chest. Abdomen is soft and nondistended. Skin is warm and dry to touch. Pt denies any CP, SOB, fever, chills, N/V, urinary problems. On stretcher at lowest position. Pt was BIBEMS from  for palpitations. No PMH. As per Pt he was playing golf and walking up a hill and felt palpitations and once he reach the top he felt dizzy and syncope, called his wife and on the drive home he vision became yellow for about 15seconds went to  and was sent to the ED. As per EMS pt was Afib and converted to sinus at  was given 162mg Aspirin at . Pt is OAx4. breathing unlabored on RA symmetrical rise and fall of the chest. Abdomen is soft and nondistended. Skin is warm and dry to touch. Pt denies any CP, SOB, fever, chills, N/V, urinary problems. On stretcher at lowest position.

## 2022-08-18 NOTE — ED CDU PROVIDER INITIAL DAY NOTE - NSICDXFAMILYHX_GEN_ALL_CORE_FT
FAMILY HISTORY:  Father  Still living? Unknown  Family history of diabetes mellitus, Age at diagnosis: Age Unknown    Sibling  Still living? Unknown  Family history of diabetes mellitus, Age at diagnosis: Age Unknown

## 2022-08-18 NOTE — ED PROVIDER NOTE - INTERPRETATION
normal sinus rhythm, Normal axis, Normal NC interval and QRS complex. There are no acute ischemic ST or T-wave changes. normal sinus rhythm, Normal axis, Normal CT interval and QRS complex. There are no acute ischemic ST or T-wave changes. normal sinus rhythm, Normal axis, Normal WA interval and QRS complex. There are no acute ischemic ST or T-wave changes.

## 2022-08-18 NOTE — ED PROVIDER NOTE - PROGRESS NOTE DETAILS
Spoke with the cardiologist Dr. Gonzalez, agreed with the plan to admit the patient to CDU. Patient to obtain Echocardiogram and Holter monitoring tomorrow. Spoke with the cardiologist Dr. Gonazlez, agreed with the plan to admit the patient to CDU. Patient to obtain Echocardiogram and Holter monitoring tomorrow.

## 2022-08-18 NOTE — ED CDU PROVIDER INITIAL DAY NOTE - NS ED ROS FT
Constitutional: No fever or chills +syncope   Eyes: No visual changes, eye pain   CV: +palpitations No chest pain or lower extremity edema  Resp: No SOB no cough  GI: No abd pain. No nausea or vomiting. No diarrhea.   : No dysuria, hematuria.   MSK: No musculoskeletal pain  Skin: No rash  Psych: No complaints   Neuro: No headache. No numbness or tingling.   Endo: No known diabetes

## 2022-08-18 NOTE — ED PROVIDER NOTE - NS ED ROS FT
GENERAL: No fever, chills  HEENT: No cough, congestion, odynophagia, dysphagia  CARDIAC: No chest pain, + palpitations, + lightheadedness, + syncope  PULM: No dyspnea, cough, wheezing   GI: No abdominal pain, nausea, vomiting, diarrhea, constipation, melena, hematochezia  : No urinary dysuria, frequency, incontinence, hematuria  NEURO: No headache, motor weakness, sensory changes  MSK: No joint pain, back pain, pain in extremities  SKIN: no rashes, hives, urticaria  HEME: no active bleeding, bruising

## 2022-08-18 NOTE — CONSULT NOTE ADULT - ASSESSMENT
Patient is a 45 y/o male with no past medical history, not on any medications presented to the ER after he experienced palpitations and syncopal episode. Patient was playing golf and exerting himself when he experienced palpitations, changes in vision, and experienced a transient loss of consciousness lasting for a few seconds with complete regaining of his mental faculties. Patient did not experience any urinary/bowel incontinence. He denies any focal neurological deficits associated with this episode. Denies ever experiencing similar symptoms in the past. Patient reports a few transient episodes of palpitations which self resolved.  	Upon the syncopal episode today, the patient presented to the urgent care where he was found to be in afib on 12 lead EKG. Patient self converted. Patient was given 162 mg of ASA and was advised to follow up in the ER.   pt with ?a.fib converted spontaneously, no fhx of sudden death  cardiac enzyme  echo   asa daily  cardiac ct  tsh/ lipid  check orthostatic  beta blocker  dvt prophylaxis   Patient is a 47 y/o male with no past medical history, not on any medications presented to the ER after he experienced palpitations and syncopal episode. Patient was playing golf and exerting himself when he experienced palpitations, changes in vision, and experienced a transient loss of consciousness lasting for a few seconds with complete regaining of his mental faculties. Patient did not experience any urinary/bowel incontinence. He denies any focal neurological deficits associated with this episode. Denies ever experiencing similar symptoms in the past. Patient reports a few transient episodes of palpitations which self resolved.  	Upon the syncopal episode today, the patient presented to the urgent care where he was found to be in afib on 12 lead EKG. Patient self converted. Patient was given 162 mg of ASA and was advised to follow up in the ER.   pt with ?a.fib converted spontaneously, no fhx of sudden death  cardiac enzyme  echo   asa daily  cardiac ct  tsh/ lipid  check orthostatic  beta blocker  dvt prophylaxis

## 2022-08-18 NOTE — ED ADULT NURSE NOTE - NSIMPLEMENTINTERV_GEN_ALL_ED
Implemented All Universal Safety Interventions:  [place] to call system. Call bell, personal items and telephone within reach. Instruct patient to call for assistance. Room bathroom lighting operational. Non-slip footwear when patient is off stretcher. Physically safe environment: no spills, clutter or unnecessary equipment. Stretcher in lowest position, wheels locked, appropriate side rails in place. Implemented All Universal Safety Interventions:  [place] to call system. Call bell, personal items and telephone within reach. Instruct patient to call for assistance. Room bathroom lighting operational. Non-slip footwear when patient is off stretcher. Physically safe environment: no spills, clutter or unnecessary equipment. Stretcher in lowest position, wheels locked, appropriate side rails in place. Implemented All Universal Safety Interventions:  [place] to call system. Call bell, personal items and telephone within reach. Instruct patient to call for assistance. Room bathroom lighting operational. Non-slip footwear when patient is off stretcher. Physically safe environment: no spills, clutter or unnecessary equipment. Stretcher in lowest position, wheels locked, appropriate side rails in place.

## 2022-08-18 NOTE — ED PROVIDER NOTE - CHILD ABUSE FACILITY
[facility]

## 2022-08-18 NOTE — ED PROVIDER NOTE - CLINICAL SUMMARY MEDICAL DECISION MAKING FREE TEXT BOX
47 y/o male with no past medical history presented with palpitations and syncopal episode. Patient self converted and now in sinus rhythm. Patient otherwise denies chest pain.     Paroxysmal atrial fibrillation  - CBC, CMP, TSH, EKG ordered  - Cardiology consulted 45 y/o male  ,a lot of stress at work ,looking for another job  with no past medical history presented with palpitations and syncopal episode Afib [facility]g at [facility] . Patient self converted and now in sinus rhythm. Patient otherwise denies chest pain.   will obtain  blood work ,cardiology cons reassess ZR 47 y/o male  ,a lot of stress at work ,looking for another job  with no past medical history presented with palpitations and syncopal episode Afib ON ecg at [facility] . Patient self converted and now in sinus rhythm. Patient otherwise denies chest pain.   will obtain  blood work ,cardiology cons reassess ZR

## 2022-08-18 NOTE — ED ADULT NURSE REASSESSMENT NOTE - NS ED NURSE REASSESS COMMENT FT1
Pt received from [place] Rodriguez. Pt oriented to CDU & plan of care was discussed. Pt A&O x 4. Pt in CDU for vital signs q4h, Monitor on tele, TTE, frequent re-evaluations. Pt denies any chest pain, SOB, dizziness or palpitations as of now. Pt on a cardiac monitor in NSR, HR in 70's. V/S stable, pt afebrile,  IV in place, patent and free of signs of infiltration. Pt resting in bed. Safety & comfort measures maintained. Call bell in reach. Will continue to monitor.

## 2022-08-18 NOTE — ED CDU PROVIDER INITIAL DAY NOTE - PROGRESS NOTE DETAILS
Dr. Rubin at bedside, recommending CTC (r/o congential abnormality) and to start Metoprolol succ 25mg tonight, with daily Aspirin 81mg. OK to discontinue Heparin. - NAYA NevesC

## 2022-08-18 NOTE — ED CLERICAL - DIVISION
[facility]... [facility]... [facility]...

## 2022-08-19 VITALS
SYSTOLIC BLOOD PRESSURE: 129 MMHG | HEART RATE: 63 BPM | TEMPERATURE: 97 F | OXYGEN SATURATION: 97 % | DIASTOLIC BLOOD PRESSURE: 85 MMHG | RESPIRATION RATE: 18 BRPM

## 2022-08-19 LAB
A1C WITH ESTIMATED AVERAGE GLUCOSE RESULT: 5.9 % — HIGH (ref 4–5.6)
CHOLEST SERPL-MCNC: 176 MG/DL — SIGNIFICANT CHANGE UP
ESTIMATED AVERAGE GLUCOSE: 123 MG/DL — HIGH (ref 68–114)
HDLC SERPL-MCNC: 48 MG/DL — SIGNIFICANT CHANGE UP
LIPID PNL WITH DIRECT LDL SERPL: 106 MG/DL — HIGH
NON HDL CHOLESTEROL: 128 MG/DL — SIGNIFICANT CHANGE UP
TRIGL SERPL-MCNC: 114 MG/DL — SIGNIFICANT CHANGE UP

## 2022-08-19 PROCEDURE — 85730 THROMBOPLASTIN TIME PARTIAL: CPT

## 2022-08-19 PROCEDURE — 75574 CT ANGIO HRT W/3D IMAGE: CPT | Mod: MF

## 2022-08-19 PROCEDURE — 80048 BASIC METABOLIC PNL TOTAL CA: CPT

## 2022-08-19 PROCEDURE — 83735 ASSAY OF MAGNESIUM: CPT

## 2022-08-19 PROCEDURE — G1004: CPT

## 2022-08-19 PROCEDURE — 80061 LIPID PANEL: CPT

## 2022-08-19 PROCEDURE — 85610 PROTHROMBIN TIME: CPT

## 2022-08-19 PROCEDURE — 93306 TTE W/DOPPLER COMPLETE: CPT | Mod: 26

## 2022-08-19 PROCEDURE — 99285 EMERGENCY DEPT VISIT HI MDM: CPT | Mod: 25

## 2022-08-19 PROCEDURE — 84484 ASSAY OF TROPONIN QUANT: CPT

## 2022-08-19 PROCEDURE — 84443 ASSAY THYROID STIM HORMONE: CPT

## 2022-08-19 PROCEDURE — 83036 HEMOGLOBIN GLYCOSYLATED A1C: CPT

## 2022-08-19 PROCEDURE — 71045 X-RAY EXAM CHEST 1 VIEW: CPT

## 2022-08-19 PROCEDURE — 93005 ELECTROCARDIOGRAM TRACING: CPT | Mod: XU

## 2022-08-19 PROCEDURE — G0378: CPT

## 2022-08-19 PROCEDURE — 87637 SARSCOV2&INF A&B&RSV AMP PRB: CPT

## 2022-08-19 PROCEDURE — 99217: CPT

## 2022-08-19 PROCEDURE — 36415 COLL VENOUS BLD VENIPUNCTURE: CPT

## 2022-08-19 PROCEDURE — 85025 COMPLETE CBC W/AUTO DIFF WBC: CPT

## 2022-08-19 PROCEDURE — 93306 TTE W/DOPPLER COMPLETE: CPT

## 2022-08-19 PROCEDURE — 80053 COMPREHEN METABOLIC PANEL: CPT

## 2022-08-19 PROCEDURE — 75574 CT ANGIO HRT W/3D IMAGE: CPT | Mod: 26,MF

## 2022-08-19 RX ORDER — METOPROLOL TARTRATE 50 MG
1 TABLET ORAL
Qty: 30 | Refills: 0
Start: 2022-08-19 | End: 2022-09-17

## 2022-08-19 NOTE — PROGRESS NOTE ADULT - SUBJECTIVE AND OBJECTIVE BOX
CARDIOLOGY     PROGRESS  NOTE   ________________________________________________    CHIEF COMPLAINT:Patient is a 46y old  Male who presents with a chief complaint of a.fib (18 Aug 2022 21:11)  no complain.  	  REVIEW OF SYSTEMS:  CONSTITUTIONAL: No fever, weight loss, or fatigue  EYES: No eye pain, visual disturbances, or discharge  ENT:  No difficulty hearing, tinnitus, vertigo; No sinus or throat pain  NECK: No pain or stiffness  RESPIRATORY: No cough, wheezing, chills or hemoptysis; No Shortness of Breath  CARDIOVASCULAR: No chest pain, palpitations, passing out, dizziness, or leg swelling  GASTROINTESTINAL: No abdominal or epigastric pain. No nausea, vomiting, or hematemesis; No diarrhea or constipation. No melena or hematochezia.  GENITOURINARY: No dysuria, frequency, hematuria, or incontinence  NEUROLOGICAL: No headaches, memory loss, loss of strength, numbness, or tremors  SKIN: No itching, burning, rashes, or lesions   LYMPH Nodes: No enlarged glands  ENDOCRINE: No heat or cold intolerance; No hair loss  MUSCULOSKELETAL: No joint pain or swelling; No muscle, back, or extremity pain  PSYCHIATRIC: No depression, anxiety, mood swings, or difficulty sleeping  HEME/LYMPH: No easy bruising, or bleeding gums  ALLERGY AND IMMUNOLOGIC: No hives or eczema	    [ ] All others negative	  [ ] Unable to obtain    PHYSICAL EXAM:  T(C): 36.4 (08-19-22 @ 07:47), Max: 36.7 (08-18-22 @ 16:59)  HR: 57 (08-19-22 @ 07:47) (57 - 82)  BP: 124/83 (08-19-22 @ 07:47) (102/70 - 130/85)  RR: 18 (08-19-22 @ 07:47) (18 - 19)  SpO2: 98% (08-19-22 @ 07:47) (97% - 99%)  Wt(kg): --  I&O's Summary      Appearance: Normal	  HEENT:   Normal oral mucosa, PERRL, EOMI	  Lymphatic: No lymphadenopathy  Cardiovascular: Normal S1 S2, No JVD, + murmurs, No edema  Respiratory: rhonchi  Psychiatry: A & O x 3, Mood & affect appropriate  Gastrointestinal:  Soft, Non-tender, + BS	  Skin: No rashes, No ecchymoses, No cyanosis	  Neurologic: Non-focal  Extremities: Normal range of motion, No clubbing, cyanosis or edema  Vascular: Peripheral pulses palpable 2+ bilaterally    MEDICATIONS  (STANDING):  aspirin  chewable 81 milliGRAM(s) Oral daily  metoprolol succinate ER 25 milliGRAM(s) Oral daily      TELEMETRY: 	    ECG:  	  RADIOLOGY:  OTHER: 	  	  LABS:	 	    CARDIAC MARKERS:                                14.4   8.68  )-----------( 241      ( 18 Aug 2022 17:41 )             42.9     08-18    142  |  104  |  18  ----------------------------<  109<H>  3.8   |  25  |  1.25    Ca    10.0      18 Aug 2022 20:21  Mg     2.5     08-18    TPro  7.2  /  Alb  4.6  /  TBili  1.1  /  DBili  x   /  AST  23  /  ALT  19  /  AlkPhos  77  08-18    proBNP:   Lipid Profile:   HgA1c:   TSH: Thyroid Stimulating Hormone, Serum: 0.64 uIU/mL (08-18 @ 18:03)    PT/INR - ( 18 Aug 2022 17:41 )   PT: 12.9 sec;   INR: 1.12 ratio         PTT - ( 18 Aug 2022 17:41 )  PTT:29.6 sec      Assessment and plan  ---------------------------  Patient is a 45 y/o male with no past medical history, not on any medications presented to the ER after he experienced palpitations and syncopal episode. Patient was playing golf and exerting himself when he experienced palpitations, changes in vision, and experienced a transient loss of consciousness lasting for a few seconds with complete regaining of his mental faculties. Patient did not experience any urinary/bowel incontinence. He denies any focal neurological deficits associated with this episode. Denies ever experiencing similar symptoms in the past. Patient reports a few transient episodes of palpitations which self resolved.  	Upon the syncopal episode today, the patient presented to the urgent care where he was found to be in afib on 12 lead EKG. Patient self converted. Patient was given 162 mg of ASA and was advised to follow up in the ER.   pt with ?a.fib converted spontaneously, no fhx of sudden death  cardiac enzyme  echo   asa daily  cardiac ct  tsh/ lipid  check orthostatic  beta blocker  awaiting cardiac ct/ echo results    	                    CARDIOLOGY     PROGRESS  NOTE   ________________________________________________    CHIEF COMPLAINT:Patient is a 46y old  Male who presents with a chief complaint of a.fib (18 Aug 2022 21:11)  no complain.  	  REVIEW OF SYSTEMS:  CONSTITUTIONAL: No fever, weight loss, or fatigue  EYES: No eye pain, visual disturbances, or discharge  ENT:  No difficulty hearing, tinnitus, vertigo; No sinus or throat pain  NECK: No pain or stiffness  RESPIRATORY: No cough, wheezing, chills or hemoptysis; No Shortness of Breath  CARDIOVASCULAR: No chest pain, palpitations, passing out, dizziness, or leg swelling  GASTROINTESTINAL: No abdominal or epigastric pain. No nausea, vomiting, or hematemesis; No diarrhea or constipation. No melena or hematochezia.  GENITOURINARY: No dysuria, frequency, hematuria, or incontinence  NEUROLOGICAL: No headaches, memory loss, loss of strength, numbness, or tremors  SKIN: No itching, burning, rashes, or lesions   LYMPH Nodes: No enlarged glands  ENDOCRINE: No heat or cold intolerance; No hair loss  MUSCULOSKELETAL: No joint pain or swelling; No muscle, back, or extremity pain  PSYCHIATRIC: No depression, anxiety, mood swings, or difficulty sleeping  HEME/LYMPH: No easy bruising, or bleeding gums  ALLERGY AND IMMUNOLOGIC: No hives or eczema	    [ ] All others negative	  [ ] Unable to obtain    PHYSICAL EXAM:  T(C): 36.4 (08-19-22 @ 07:47), Max: 36.7 (08-18-22 @ 16:59)  HR: 57 (08-19-22 @ 07:47) (57 - 82)  BP: 124/83 (08-19-22 @ 07:47) (102/70 - 130/85)  RR: 18 (08-19-22 @ 07:47) (18 - 19)  SpO2: 98% (08-19-22 @ 07:47) (97% - 99%)  Wt(kg): --  I&O's Summary      Appearance: Normal	  HEENT:   Normal oral mucosa, PERRL, EOMI	  Lymphatic: No lymphadenopathy  Cardiovascular: Normal S1 S2, No JVD, + murmurs, No edema  Respiratory: rhonchi  Psychiatry: A & O x 3, Mood & affect appropriate  Gastrointestinal:  Soft, Non-tender, + BS	  Skin: No rashes, No ecchymoses, No cyanosis	  Neurologic: Non-focal  Extremities: Normal range of motion, No clubbing, cyanosis or edema  Vascular: Peripheral pulses palpable 2+ bilaterally    MEDICATIONS  (STANDING):  aspirin  chewable 81 milliGRAM(s) Oral daily  metoprolol succinate ER 25 milliGRAM(s) Oral daily      TELEMETRY: 	    ECG:  	  RADIOLOGY:  OTHER: 	  	  LABS:	 	    CARDIAC MARKERS:                                14.4   8.68  )-----------( 241      ( 18 Aug 2022 17:41 )             42.9     08-18    142  |  104  |  18  ----------------------------<  109<H>  3.8   |  25  |  1.25    Ca    10.0      18 Aug 2022 20:21  Mg     2.5     08-18    TPro  7.2  /  Alb  4.6  /  TBili  1.1  /  DBili  x   /  AST  23  /  ALT  19  /  AlkPhos  77  08-18    proBNP:   Lipid Profile:   HgA1c:   TSH: Thyroid Stimulating Hormone, Serum: 0.64 uIU/mL (08-18 @ 18:03)    PT/INR - ( 18 Aug 2022 17:41 )   PT: 12.9 sec;   INR: 1.12 ratio         PTT - ( 18 Aug 2022 17:41 )  PTT:29.6 sec      Assessment and plan  ---------------------------  Patient is a 47 y/o male with no past medical history, not on any medications presented to the ER after he experienced palpitations and syncopal episode. Patient was playing golf and exerting himself when he experienced palpitations, changes in vision, and experienced a transient loss of consciousness lasting for a few seconds with complete regaining of his mental faculties. Patient did not experience any urinary/bowel incontinence. He denies any focal neurological deficits associated with this episode. Denies ever experiencing similar symptoms in the past. Patient reports a few transient episodes of palpitations which self resolved.  	Upon the syncopal episode today, the patient presented to the urgent care where he was found to be in afib on 12 lead EKG. Patient self converted. Patient was given 162 mg of ASA and was advised to follow up in the ER.   pt with ?a.fib converted spontaneously, no fhx of sudden death  cardiac enzyme  echo   asa daily  cardiac ct  tsh/ lipid  check orthostatic  beta blocker  awaiting cardiac ct/ echo results

## 2022-08-19 NOTE — ED CDU PROVIDER SUBSEQUENT DAY NOTE - MEDICAL DECISION MAKING DETAILS
Patient with syncope episode and new onset a.fib that resolved. Patient in cdu for echo which was wnl. Result of CTA reported to Dr. Rubin who will f/u patient as outpatient. Patient stable for outpatient f/u and will prescribe aspirin and metoprolol for outpatient.

## 2022-08-19 NOTE — ED CDU PROVIDER SUBSEQUENT DAY NOTE - HISTORY
No interval changes since initial CDU provider note. Pt feels well without complaint. NAD VSS. no events on tele-NSR in the 50s while sleeping. Plan for CTC and echo in the setting of new onset Afib. Cardiology following.  Beka Whatley

## 2022-08-19 NOTE — ED CDU PROVIDER DISPOSITION NOTE - CLINICAL COURSE
Patient is a 47 y/o male with no past medical history, not on any medications presented to the ER after he experienced palpitations and syncopal episode. Patient was playing golf and exerting himself when he experienced palpitations, changes in vision, and experienced a transient loss of consciousness lasting for a few seconds with complete regaining of his mental faculties. Patient did not experience any urinary/bowel incontinence. He denies any focal neurological deficits associated with this episode. Denies ever experiencing similar symptoms in the past. Patient reports a few transient episodes of palpitations which self resolved. Upon the syncopal episode today, the patient presented to the urgent care where he was found to be in Afib. Patient self converted to NSR. Reportedly given Aspirin.  Patient otherwise denies any complaints at the moment such as chest pain, palpitations, nausea, vomiting, headaches. PMD Lita Espana.  ED course: EKG NSR. Cardiology consulted. Plan for echo and cardiology recommendations in CDU. Patient is a 45 y/o male with no past medical history, not on any medications presented to the ER after he experienced palpitations and syncopal episode. Patient was playing golf and exerting himself when he experienced palpitations, changes in vision, and experienced a transient loss of consciousness lasting for a few seconds with complete regaining of his mental faculties. Patient did not experience any urinary/bowel incontinence. He denies any focal neurological deficits associated with this episode. Denies ever experiencing similar symptoms in the past. Patient reports a few transient episodes of palpitations which self resolved. Upon the syncopal episode today, the patient presented to the urgent care where he was found to be in Afib. Patient self converted to NSR. Reportedly given Aspirin.  Patient otherwise denies any complaints at the moment such as chest pain, palpitations, nausea, vomiting, headaches. PMD Lita Espan[place].  ED course: EKG NSR. Cardiology consulted. Plan for echo and cardiology recommendations in CDU. Patient is a 45 y/o male with no past medical history, not on any medications presented to the ER after he experienced palpitations and syncopal episode. Patient was playing golf and exerting himself when he experienced palpitations, changes in vision, and experienced a transient loss of consciousness lasting for a few seconds with complete regaining of his mental faculties. Patient did not experience any urinary/bowel incontinence. He denies any focal neurological deficits associated with this episode. Denies ever experiencing similar symptoms in the past. Patient reports a few transient episodes of palpitations which self resolved. Upon the syncopal episode today, the patient presented to the urgent care where he was found to be in Afib. Patient self converted to NSR. Reportedly given Aspirin.  Patient otherwise denies any complaints at the moment such as chest pain, palpitations, nausea, vomiting, headaches. PMD Lita [place].  ED course: EKG NSR. Cardiology consulted. Plan for echo and cardiology recommendations in CDU.  No events on tele, pt remained in NSR.  TTE, CTC (with non-obstructive CAD), and lab results reviewed with Dr. Rubin, plan to d/c patient on ASA 81mg, metoprolol succinate 25mg, and to f/u with Dr. Rubin in office in 2 weeks. All results d/w patient along with d/c instructions and f/u information. Stable for d/c. D/w Dr. Gomez. Patient is a 47 y/o male with no past medical history, not on any medications presented to the ER after he experienced palpitations and syncopal episode. Patient was playing golf and exerting himself when he experienced palpitations, changes in vision, and experienced a transient loss of consciousness lasting for a few seconds with complete regaining of his mental faculties. Patient did not experience any urinary/bowel incontinence. He denies any focal neurological deficits associated with this episode. Denies ever experiencing similar symptoms in the past. Patient reports a few transient episodes of palpitations which self resolved. Upon the syncopal episode today, the patient presented to the urgent care where he was found to be in Afib. Patient self converted to NSR. Reportedly given Aspirin.  Patient otherwise denies any complaints at the moment such as chest pain, palpitations, nausea, vomiting, headaches. PMD Lita E[place].  ED course: EKG NSR. Cardiology consulted. Plan for echo and cardiology recommendations in CDU.  No events on tele, pt remained in NSR.  TTE, CTC (with non-obstructive CAD), and lab results reviewed with Dr. Rubin, plan to d/c patient on ASA 81mg, metoprolol succinate 25mg, and to f/u with Dr. Rubin in office in 2 weeks. All results d/w patient along with d/c instructions and f/u information. Stable for d/c. D/w Dr. Gomez.

## 2022-08-19 NOTE — ED CDU PROVIDER DISPOSITION NOTE - NSFOLLOWUPINSTRUCTIONS_ED_ALL_ED_FT
Hydrate.     Start taking Aspirin 81mg daily.  Also start taking Metoprolol succinate 25mg daily.     We recommend you follow up with your primary care provider within the next 2-3 days, please bring all of your results with you.     *CARDS FINAL RECCS     Please return to the Emergency Department with new, worsening, or concerning symptoms, such as:  -Shortness of breath or trouble breathing  -Pressure, pain, tightness in chest  -Facial drooping, arm weakness, or speech difficulty   -Head injury or loss of consciousness   -Nonstop bleeding or an open wound     *More detailed information regarding your visit and discharge can be found by reviewing this packet Start taking Aspirin 81mg daily.  Also start taking Metoprolol succinate 25mg daily.     We recommend you follow up with your primary care provider within the next 2-3 days, please bring all of your results with you.   Be sure to follow up Dilated aortic root noted on CT scan.     Please follow up with Cardiologist, Dr. Rubin, in office in 2 weeks:  FRIDAY, 9/2/22 at 1pm:  Taras Rubin  CARDIOVASCULAR DISEASE  [address] [place] [address], [address]  [place] [address]  Phone: [phone]      Please return to the Emergency Department with new, worsening, or concerning symptoms, such as:  -Shortness of breath or trouble breathing  -Pressure, pain, tightness in chest  -Facial drooping, arm/leg weakness, trouble walking, vision changes, or speech difficulty   -Head injury or loss of consciousness   -Any other concerning symptoms    *More detailed information regarding your visit and discharge can be found by reviewing this packet Start taking Aspirin 81mg daily.  Also start taking Metoprolol succinate 25mg daily.     We recommend you follow up with your primary care provider within the next 2-3 days, please bring all of your results with you.   Be sure to follow up Dilated aortic root noted on CT scan.     Please follow up with Cardiologist, Dr. Rubin, in office in 2 weeks:  FRIDAY, 9/2/22 at 1pm:  Taras Rubin  CARDIOVASCULAR DISEASE  [address] [place] [address], [address]  [place] [address]  Phone: [phone]      Please return to the Emergency Department with new, worsening, or concerning symptoms, such as:  -Shortness of breath or trouble breathing  -Pressure, pain, tightness in chest  -Facial drooping, arm/leg weakness, trouble walking, vision changes, or speech difficulty   -Head injury or loss of consciousness   -Any other concerning symptoms    *More detailed information regarding your visit and discharge can be found by reviewing this packet Start taking Aspirin 81mg daily.  Also start taking Metoprolol succinate 25mg daily.     We recommend you follow up with your primary care provider within the next 2-3 days, please bring all of your results with you.   Be sure to follow up Dilated aortic root noted on CT scan.     Please follow up with Cardiologist, Dr. Rubin, in office in 2 weeks:  FRIDAY, 9/2/22 at 1pm:  Taras Rubin  CARDIOVASCULAR DISEASE  [address] [place] [address], [address]  [place] [address]  Phone: [phone]      Please return to the Emergency Department with new, worsening, or concerning symptoms, such as:  -Shortness of breath or trouble breathing  -Pressure, pain, tightness in chest  -Facial drooping, arm/leg weakness, trouble walking, vision changes, or speech difficulty   -Head injury or loss of consciousness   -Any other concerning symptoms    *More detailed information regarding your visit and discharge can be found by reviewing this packet

## 2022-08-19 NOTE — ED CDU PROVIDER SUBSEQUENT DAY NOTE - ATTENDING APP SHARED VISIT CONTRIBUTION OF CARE
I, Chaka Gomez, performed a history and physical exam of the patient and discussed their management with the resident and /or advanced care provider. I reviewed the resident and /or ACP's note and agree with the documented findings and plan of care. I was present and available for all procedures.  Patient with syncope episode and new onset a.fib that resolved. Patient in cdu for echo which was wnl. Result of CTA reported to Dr. Rubin who will f/u patient as outpatient. Patient stable for outpatient f/u and will prescribe aspirin and metoprolol for outpatient.

## 2022-08-19 NOTE — ED CDU PROVIDER DISPOSITION NOTE - CARE PROVIDER_API CALL
Taras Rubin  CARDIOVASCULAR DISEASE  [address] [facility] [address] [facility], [address]  [place] [address]  Phone: [phone]  Fax: [phone]  Follow Up Time: 4-6 Days   Taras Rubin  CARDIOVASCULAR DISEASE  [address] [facility], [address]  [place] [address]  Phone: [phone]  Fax: [phone]  Follow Up Time: 4-6 Days   Taras Rubin  CARDIOVASCULAR DISEASE  [address] [facility], [address]  [place] [address]  Phone: [phone]  Fax: [phone]  Follow Up Time: 4-6 Days

## 2022-08-19 NOTE — ED CDU PROVIDER SUBSEQUENT DAY NOTE - PHYSICAL EXAMINATION
GENERAL: Not in acute distress, non-toxic appearing  		HEAD: normocephalic, atraumatic  		HEENT: EOMI, normal conjunctiva  		CARDIAC: regular rate and rhythm, normal S1 and S2,  no appreciable murmurs  		PULM: clear to ascultation bilaterally, no crackles, rales, rhonchi, or wheezing  		GI: abdomen nondistended, soft, nontender  		NEURO: alert and oriented x 3, normal speech, no focal motor or sensory deficits, gait steady, no gross neurologic deficit  		MSK: No visible deformities, no peripheral edema, calf tenderness/redness/swelling  PSYCH: appropriate mood and affect

## 2022-08-19 NOTE — ED CDU PROVIDER DISPOSITION NOTE - PATIENT PORTAL LINK FT
You can access the FollowMyHealth Patient Portal offered by [facility] by registering at the following website: [email or web address]. By joining [employer]’s FollowMyHealth portal, you will also be able to view your health information using other applications (apps) compatible with our system. You can access the FollowMyHealth Patient Portal offered by [facility] by registering at the following website: [email or web address]. By joining [employer]’s FollowMyHealth portal, you will also be able to view your health information using other applications (apps) compatible with our system. You can access the FollowMyHealth Patient Portal offered by [facility] by registering at the following website: [email or web address]. By joining [employer]’s FollowMyHealth portal, you will also be able to view your health information using other applications (apps) compatible with our system.

## 2022-08-26 PROBLEM — Z00.00 ENCOUNTER FOR PREVENTIVE HEALTH EXAMINATION: Status: ACTIVE | Noted: 2022-08-26

## 2022-09-15 ENCOUNTER — RESULT REVIEW (OUTPATIENT)
Age: 47
End: 2022-09-15

## 2022-09-23 ENCOUNTER — APPOINTMENT (OUTPATIENT)
Dept: GASTROENTEROLOGY | Facility: CLINIC | Age: 47
End: 2022-09-23

## 2023-01-04 ENCOUNTER — TRANSCRIPTION ENCOUNTER (OUTPATIENT)
Age: 48
End: 2023-01-04

## 2023-12-15 PROBLEM — I10 ESSENTIAL (PRIMARY) HYPERTENSION: Chronic | Status: ACTIVE | Noted: 2019-01-03

## 2025-02-19 NOTE — ED ADULT NURSE REASSESSMENT NOTE - CARDIO ASSESSMENT
--- Render Risk Assessment In Note?: no Additional Notes: Biopsy proven 8/2024. Extensively reviewed diagnosis and potential treatment options today. Topical and systemic options discussed. Also discussed that could give it time to see if resolved on its own since not particularly symptomatic for patient. Patient would like to try stronger topical steroid before considering any further options at this time. Instructed to call with any questions/concerns prior to follow up. Detail Level: Simple

## 2025-08-21 ENCOUNTER — APPOINTMENT (OUTPATIENT)
Dept: OTOLARYNGOLOGY | Facility: CLINIC | Age: 50
End: 2025-08-21
Payer: COMMERCIAL

## 2025-08-21 DIAGNOSIS — H91.93 UNSPECIFIED HEARING LOSS, BILATERAL: ICD-10-CM

## 2025-08-21 DIAGNOSIS — J34.829 NASAL VALVE COLLAPSE, UNSPECIFIED: ICD-10-CM

## 2025-08-21 DIAGNOSIS — J31.0 CHRONIC RHINITIS: ICD-10-CM

## 2025-08-21 DIAGNOSIS — R04.0 EPISTAXIS: ICD-10-CM

## 2025-08-21 DIAGNOSIS — J34.3 HYPERTROPHY OF NASAL TURBINATES: ICD-10-CM

## 2025-08-21 DIAGNOSIS — H90.3 SENSORINEURAL HEARING LOSS, BILATERAL: ICD-10-CM

## 2025-08-21 PROCEDURE — 99203 OFFICE O/P NEW LOW 30 MIN: CPT | Mod: 25

## 2025-08-21 PROCEDURE — 31231 NASAL ENDOSCOPY DX: CPT

## 2025-08-21 PROCEDURE — 92557 COMPREHENSIVE HEARING TEST: CPT

## 2025-08-21 PROCEDURE — 92567 TYMPANOMETRY: CPT

## 2025-08-22 PROBLEM — H91.93 HIGH FREQUENCY HEARING LOSS OF BOTH EARS: Status: ACTIVE | Noted: 2025-08-22

## 2025-09-11 ENCOUNTER — APPOINTMENT (OUTPATIENT)
Dept: OTOLARYNGOLOGY | Facility: CLINIC | Age: 50
End: 2025-09-11